# Patient Record
Sex: FEMALE | URBAN - METROPOLITAN AREA
[De-identification: names, ages, dates, MRNs, and addresses within clinical notes are randomized per-mention and may not be internally consistent; named-entity substitution may affect disease eponyms.]

---

## 2018-10-08 ENCOUNTER — OFFICE VISIT (OUTPATIENT)
Dept: URBAN - METROPOLITAN AREA CLINIC 58 | Facility: CLINIC | Age: 58
End: 2018-10-08
Payer: COMMERCIAL

## 2018-10-08 DIAGNOSIS — H25.13 AGE-RELATED NUCLEAR CATARACT, BILATERAL: Primary | ICD-10-CM

## 2018-10-08 PROCEDURE — 92004 COMPRE OPH EXAM NEW PT 1/>: CPT | Performed by: OPHTHALMOLOGY

## 2018-10-08 RX ORDER — PREDNISOLONE ACETATE 10 MG/ML
1 % SUSPENSION/ DROPS OPHTHALMIC
Qty: 1 | Refills: 1 | Status: INACTIVE
Start: 2018-10-08 | End: 2019-02-15

## 2018-10-08 RX ORDER — OFLOXACIN 3 MG/ML
0.3 % SOLUTION/ DROPS OPHTHALMIC
Qty: 1 | Refills: 1 | Status: INACTIVE
Start: 2018-10-08 | End: 2019-02-15

## 2018-10-08 ASSESSMENT — INTRAOCULAR PRESSURE
OD: 12
OS: 14

## 2018-10-08 ASSESSMENT — VISUAL ACUITY
OD: 20/40
OS: 20/60

## 2018-10-08 ASSESSMENT — KERATOMETRY
OD: 44.50
OS: 44.50

## 2018-10-08 NOTE — IMPRESSION/PLAN
Impression: Age-related nuclear cataract, bilateral: H25.13. Plan: Cataracts account for the patient's complaints. Discussed all risks, benefits, procedures and recovery for cataract surgery in detail with the patient. Patient understands changing glasses will not improve vision. Patient desires to have surgery, recommend phacoemulsification with intraocular lens implant. Discussed lens implant options. Patient would like standard lens with a distance refractive goal.  Patient understands that glasses are needed after surgery.  Need clearance from pulmonalogist

## 2018-10-08 NOTE — IMPRESSION/PLAN
Impression: Type 2 diabetes mellitus w/o complication: M04.3. Plan: Diabetes type II: no background retinopathy, no signs of neovascularization noted. Discussed ocular and systemic benefits of blood sugar control.

## 2019-01-10 ENCOUNTER — Encounter (OUTPATIENT)
Dept: URBAN - METROPOLITAN AREA CLINIC 58 | Facility: CLINIC | Age: 59
End: 2019-01-10
Payer: COMMERCIAL

## 2019-01-10 PROCEDURE — 92136 OPHTHALMIC BIOMETRY: CPT | Performed by: OPHTHALMOLOGY

## 2019-01-18 ENCOUNTER — Encounter (OUTPATIENT)
Dept: URBAN - METROPOLITAN AREA CLINIC 58 | Facility: CLINIC | Age: 59
End: 2019-01-18
Payer: COMMERCIAL

## 2019-01-18 PROCEDURE — W9997 IOL SELECTION: HCPCS | Performed by: OPHTHALMOLOGY

## 2019-01-29 ENCOUNTER — SURGERY (OUTPATIENT)
Dept: URBAN - METROPOLITAN AREA SURGERY 32 | Facility: SURGERY | Age: 59
End: 2019-01-29
Payer: COMMERCIAL

## 2019-01-29 PROCEDURE — 66984 XCAPSL CTRC RMVL W/O ECP: CPT | Performed by: OPHTHALMOLOGY

## 2019-01-30 ENCOUNTER — POST-OPERATIVE VISIT (OUTPATIENT)
Dept: URBAN - METROPOLITAN AREA CLINIC 58 | Facility: CLINIC | Age: 59
End: 2019-01-30

## 2019-01-30 PROCEDURE — 99024 POSTOP FOLLOW-UP VISIT: CPT | Performed by: OPTOMETRIST

## 2019-01-30 ASSESSMENT — INTRAOCULAR PRESSURE
OD: 13
OS: 24

## 2019-02-07 ENCOUNTER — POST-OPERATIVE VISIT (OUTPATIENT)
Dept: URBAN - METROPOLITAN AREA CLINIC 58 | Facility: CLINIC | Age: 59
End: 2019-02-07

## 2019-02-07 PROCEDURE — 99024 POSTOP FOLLOW-UP VISIT: CPT | Performed by: OPTOMETRIST

## 2019-02-07 ASSESSMENT — INTRAOCULAR PRESSURE
OS: 15
OD: 13

## 2019-02-07 ASSESSMENT — VISUAL ACUITY
OS: 20/25-2
OD: 20/25-2

## 2019-02-18 ENCOUNTER — SURGERY (OUTPATIENT)
Dept: URBAN - METROPOLITAN AREA SURGERY 32 | Facility: SURGERY | Age: 59
End: 2019-02-18
Payer: COMMERCIAL

## 2019-02-18 PROCEDURE — 66984 XCAPSL CTRC RMVL W/O ECP: CPT | Performed by: OPHTHALMOLOGY

## 2019-02-18 RX ORDER — ACETAZOLAMIDE 250 MG/1
250 MG TABLET ORAL
Qty: 4 | Refills: 0 | Status: INACTIVE
Start: 2019-02-18 | End: 2020-02-14

## 2019-02-18 ASSESSMENT — INTRAOCULAR PRESSURE: OD: 14

## 2019-02-19 ENCOUNTER — POST-OPERATIVE VISIT (OUTPATIENT)
Dept: URBAN - METROPOLITAN AREA CLINIC 58 | Facility: CLINIC | Age: 59
End: 2019-02-19

## 2019-02-19 DIAGNOSIS — Z09 ENCNTR FOR F/U EXAM AFT TRTMT FOR COND OTH THAN MALIG NEOPLM: Primary | ICD-10-CM

## 2019-02-19 PROCEDURE — 99024 POSTOP FOLLOW-UP VISIT: CPT | Performed by: OPTOMETRIST

## 2019-02-19 ASSESSMENT — INTRAOCULAR PRESSURE
OD: 10
OS: 10

## 2019-02-27 ENCOUNTER — POST-OPERATIVE VISIT (OUTPATIENT)
Dept: URBAN - METROPOLITAN AREA CLINIC 58 | Facility: CLINIC | Age: 59
End: 2019-02-27

## 2019-02-27 PROCEDURE — 99024 POSTOP FOLLOW-UP VISIT: CPT | Performed by: OPTOMETRIST

## 2019-02-27 ASSESSMENT — INTRAOCULAR PRESSURE
OD: 26
OS: 14

## 2019-02-27 ASSESSMENT — VISUAL ACUITY
OD: 20/30
OS: 20/30

## 2019-03-11 ENCOUNTER — POST-OPERATIVE VISIT (OUTPATIENT)
Dept: URBAN - METROPOLITAN AREA CLINIC 58 | Facility: CLINIC | Age: 59
End: 2019-03-11

## 2019-03-11 PROCEDURE — 99024 POSTOP FOLLOW-UP VISIT: CPT | Performed by: OPHTHALMOLOGY

## 2019-03-11 ASSESSMENT — INTRAOCULAR PRESSURE
OS: 11
OD: 13

## 2019-03-11 ASSESSMENT — VISUAL ACUITY
OS: 20/25
OD: 20/30

## 2019-06-11 ENCOUNTER — OFFICE VISIT (OUTPATIENT)
Dept: URBAN - METROPOLITAN AREA CLINIC 58 | Facility: CLINIC | Age: 59
End: 2019-06-11
Payer: COMMERCIAL

## 2019-06-11 DIAGNOSIS — H25.042 POSTERIOR SUBCAPSULAR POLAR AGE-RELATED CATARACT, LEFT EYE: Primary | ICD-10-CM

## 2019-06-11 DIAGNOSIS — E11.9 TYPE 2 DIABETES MELLITUS W/O COMPLICATION: ICD-10-CM

## 2019-06-11 PROCEDURE — 99213 OFFICE O/P EST LOW 20 MIN: CPT | Performed by: OPHTHALMOLOGY

## 2019-06-11 ASSESSMENT — VISUAL ACUITY
OD: 20/30
OS: 20/30

## 2019-06-11 ASSESSMENT — INTRAOCULAR PRESSURE
OS: 10
OD: 16

## 2019-06-11 NOTE — IMPRESSION/PLAN
Impression: Posterior subcapsular polar age-related cataract, left eye: H25.042. Plan: Discussed diagnosis in detail with patient. Discussed treatment options with patient. No treatment is required at this time. Call if [place] worsens. Will return to Dr. Marian Coffey care and for refraction.

## 2019-06-11 NOTE — IMPRESSION/PLAN
Impression: Type 2 diabetes mellitus w/o complication: W40.0. Plan: Diabetes type II: no background retinopathy, no signs of neovascularization noted. Discussed ocular and systemic benefits of blood sugar control.

## 2020-01-27 ENCOUNTER — OFFICE VISIT (OUTPATIENT)
Dept: URBAN - METROPOLITAN AREA CLINIC 58 | Facility: CLINIC | Age: 60
End: 2020-01-27
Payer: COMMERCIAL

## 2020-01-27 DIAGNOSIS — H26.492 OTHER SECONDARY CATARACT, LEFT EYE: Primary | ICD-10-CM

## 2020-01-27 PROCEDURE — 99214 OFFICE O/P EST MOD 30 MIN: CPT | Performed by: OPHTHALMOLOGY

## 2020-01-27 ASSESSMENT — VISUAL ACUITY
OS: 20/40
OD: 20/40

## 2020-01-27 ASSESSMENT — INTRAOCULAR PRESSURE
OD: 20
OS: 14

## 2020-01-27 NOTE — IMPRESSION/PLAN
Impression: Type 2 diabetes mellitus w/o complication: O38.5. Plan: Diabetes type II: no background retinopathy, no signs of neovascularization noted. Discussed ocular and systemic benefits of blood sugar control.

## 2020-01-27 NOTE — IMPRESSION/PLAN
Impression: Other secondary cataract, left eye: H26.492. Plan: Discussed treatment options with patient. YAG laser capsulotomy is recommended. Surgical risks and benefits were discussed in detail, explained, and understood by patient. Patient elects to have surgery.

## 2020-02-03 ENCOUNTER — SURGERY (OUTPATIENT)
Dept: URBAN - METROPOLITAN AREA SURGERY 32 | Facility: SURGERY | Age: 60
End: 2020-02-03
Payer: COMMERCIAL

## 2020-02-03 PROCEDURE — 66821 AFTER CATARACT LASER SURGERY: CPT | Performed by: OPHTHALMOLOGY

## 2020-02-14 ENCOUNTER — POST-OPERATIVE VISIT (OUTPATIENT)
Dept: URBAN - METROPOLITAN AREA CLINIC 58 | Facility: CLINIC | Age: 60
End: 2020-02-14

## 2020-02-14 PROCEDURE — 99024 POSTOP FOLLOW-UP VISIT: CPT | Performed by: OPTOMETRIST

## 2020-02-14 ASSESSMENT — INTRAOCULAR PRESSURE
OS: 14
OD: 16

## 2020-02-14 ASSESSMENT — VISUAL ACUITY
OD: 20/30
OS: 20/40

## 2020-03-03 ENCOUNTER — OFFICE VISIT (OUTPATIENT)
Dept: URBAN - METROPOLITAN AREA CLINIC 58 | Facility: CLINIC | Age: 60
End: 2020-03-03
Payer: COMMERCIAL

## 2020-03-03 DIAGNOSIS — H26.491 OTHER SECONDARY CATARACT, RIGHT EYE: Primary | ICD-10-CM

## 2020-03-03 PROCEDURE — 99213 OFFICE O/P EST LOW 20 MIN: CPT | Performed by: OPHTHALMOLOGY

## 2020-03-03 ASSESSMENT — VISUAL ACUITY
OD: 20/30
OS: 20/30

## 2020-03-03 ASSESSMENT — INTRAOCULAR PRESSURE
OS: 14
OD: 17

## 2020-06-08 ENCOUNTER — SURGERY (OUTPATIENT)
Dept: URBAN - METROPOLITAN AREA SURGERY 32 | Facility: SURGERY | Age: 60
End: 2020-06-08
Payer: COMMERCIAL

## 2020-06-08 PROCEDURE — 66821 AFTER CATARACT LASER SURGERY: CPT | Performed by: OPHTHALMOLOGY

## 2020-06-15 ENCOUNTER — POST-OPERATIVE VISIT (OUTPATIENT)
Dept: URBAN - METROPOLITAN AREA CLINIC 58 | Facility: CLINIC | Age: 60
End: 2020-06-15

## 2020-06-15 PROCEDURE — 99024 POSTOP FOLLOW-UP VISIT: CPT | Performed by: OPHTHALMOLOGY

## 2020-06-15 ASSESSMENT — VISUAL ACUITY
OS: 20/30
OD: 20/30-

## 2020-06-15 ASSESSMENT — INTRAOCULAR PRESSURE
OS: 15
OD: 15

## 2022-11-23 ENCOUNTER — OFFICE VISIT (OUTPATIENT)
Dept: URBAN - METROPOLITAN AREA CLINIC 58 | Facility: CLINIC | Age: 62
End: 2022-11-23
Payer: COMMERCIAL

## 2022-11-23 DIAGNOSIS — H43.312 VITREOUS MEMBRANES AND STRANDS, LEFT EYE: Primary | ICD-10-CM

## 2022-11-23 DIAGNOSIS — H04.123 DRY EYE SYNDROME OF BILATERAL LACRIMAL GLANDS: ICD-10-CM

## 2022-11-23 DIAGNOSIS — E11.9 TYPE 2 DIABETES MELLITUS W/O COMPLICATION: ICD-10-CM

## 2022-11-23 PROCEDURE — 92014 COMPRE OPH EXAM EST PT 1/>: CPT | Performed by: OPTOMETRIST

## 2022-11-23 RX ORDER — PREDNISOLONE ACETATE 10 MG/ML
1 % SUSPENSION/ DROPS OPHTHALMIC
Qty: 5 | Refills: 0 | Status: ACTIVE
Start: 2022-11-23

## 2022-11-23 ASSESSMENT — INTRAOCULAR PRESSURE
OD: 16
OS: 15

## 2022-11-23 ASSESSMENT — VISUAL ACUITY: OD: 20/30

## 2022-11-23 NOTE — IMPRESSION/PLAN
Impression: Dry eye syndrome of bilateral lacrimal glands: H04.123. Plan: Diagnosis explained in detail, condition seems severe. Start Prednisolone 1gtt OU tid x 1 wk then bid x 1 wk then qd x 1 wk then d/c. Pt to call if condition worsens. Continue AT's  PRN.

## 2022-11-23 NOTE — IMPRESSION/PLAN
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 Plan: Discussed diagnosis with Pt. There is no evidence of retinal breaks. Discussed S/S of RD. Pt to RTC if they occur.

## 2022-11-23 NOTE — IMPRESSION/PLAN
Impression: Type 2 diabetes mellitus w/o complication: C87.4. Plan: Diabetes type II: no background retinopathy, no signs of neovascularization noted. Discussed ocular and systemic benefits of blood sugar control.

## 2023-01-04 ENCOUNTER — OFFICE VISIT (OUTPATIENT)
Dept: URBAN - METROPOLITAN AREA CLINIC 58 | Facility: CLINIC | Age: 63
End: 2023-01-04
Payer: COMMERCIAL

## 2023-01-04 DIAGNOSIS — H04.123 DRY EYE SYNDROME OF BILATERAL LACRIMAL GLANDS: Primary | ICD-10-CM

## 2023-01-04 PROCEDURE — 99213 OFFICE O/P EST LOW 20 MIN: CPT | Performed by: OPTOMETRIST

## 2023-01-04 ASSESSMENT — INTRAOCULAR PRESSURE
OS: 16
OD: 16

## 2023-01-04 NOTE — IMPRESSION/PLAN
Impression: Dry eye syndrome of bilateral lacrimal glands: H04.123. Plan: Diagnosis explained in detail, condition seems severe. Prednisolone worked great. Pt to call if condition worsens. Continue AT's  PRN.